# Patient Record
(demographics unavailable — no encounter records)

---

## 2025-06-19 NOTE — PHYSICAL EXAM
[FreeTextEntry1] : Alert, NAD. Heart sounds NL. Neck FROM. PERRL, EOMI, face symmetric, hearing intact, Vf's full. Tone, power, gait NL. No nystagmus or tremor.

## 2025-06-19 NOTE — HISTORY OF PRESENT ILLNESS
[FreeTextEntry1] : 16 year old male seen on 5/7/25 with a 3 month hx of reurrrent spells of sudden collapse without warning, falls to the floor, unconscious for seconds with rapid recovery. Typically happens in the morning upon awakening. He has also had "twitching" of the limbs, especially n the morning when he may drop something. No tonic or clonic movements seen. No unresponsive staring spells. He has not yet seen a cardiologist. PMH +ve for Klinefelter mosaic. Doing well in 11th grade. Walked and talked on time. FMH -ve for epilepsy. Birth: FT C/S no complications. MRI brain is NL. Routine and 24 hr EEGs show brief farida of 3 Hz spike-wave activity, confirming the pt's Dx of ALON. Pt on Keppra 500 bid since 5/8/25. Will increase to 750 mg bid, a more appropriate dose based on his weight of 183 pounds. No further seizures so far since starting Keppra. No toxicity either. Pt asked about getting his 's license. I told him if he remains seziure-free by May 2026, I would  complete the appropriate DMV form for him.

## 2025-06-19 NOTE — DISCUSSION/SUMMARY
[FreeTextEntry1] : Juvenile myoclonic epilepsy (ALON). Will increase Keppra to 750 mg ER bid. RTO in 6 months. Note sent to Dr Silva(PCP). Total clinician time spent on 6/19/2025 is 36 minutes including preparing to see the patient, obtaining and/or reviewing and confirming history, performing a medically necessary and appropriate examination, counseling and educating the patient and/or family, documenting clinical information in the EHR and communicating and/or referring to other healthcare professionals.
